# Patient Record
Sex: MALE | Race: WHITE | Employment: FULL TIME | URBAN - METROPOLITAN AREA
[De-identification: names, ages, dates, MRNs, and addresses within clinical notes are randomized per-mention and may not be internally consistent; named-entity substitution may affect disease eponyms.]

---

## 2024-05-28 ENCOUNTER — APPOINTMENT (OUTPATIENT)
Dept: GENERAL RADIOLOGY | Facility: HOSPITAL | Age: 44
End: 2024-05-28

## 2024-05-28 ENCOUNTER — APPOINTMENT (OUTPATIENT)
Dept: GENERAL RADIOLOGY | Facility: HOSPITAL | Age: 44
End: 2024-05-28
Attending: EMERGENCY MEDICINE

## 2024-05-28 ENCOUNTER — HOSPITAL ENCOUNTER (EMERGENCY)
Facility: HOSPITAL | Age: 44
Discharge: HOME | End: 2024-05-28
Attending: EMERGENCY MEDICINE

## 2024-05-28 VITALS
WEIGHT: 220 LBS | BODY MASS INDEX: 28.23 KG/M2 | RESPIRATION RATE: 16 BRPM | HEART RATE: 81 BPM | OXYGEN SATURATION: 98 % | DIASTOLIC BLOOD PRESSURE: 83 MMHG | TEMPERATURE: 98 F | HEIGHT: 74 IN | SYSTOLIC BLOOD PRESSURE: 138 MMHG

## 2024-05-28 DIAGNOSIS — S92.002B: Primary | ICD-10-CM

## 2024-05-28 PROCEDURE — 72100 X-RAY EXAM L-S SPINE 2/3 VWS: CPT | Performed by: EMERGENCY MEDICINE

## 2024-05-28 PROCEDURE — 73650 X-RAY EXAM OF HEEL: CPT | Performed by: EMERGENCY MEDICINE

## 2024-05-28 PROCEDURE — 99284 EMERGENCY DEPT VISIT MOD MDM: CPT

## 2024-05-28 PROCEDURE — 73610 X-RAY EXAM OF ANKLE: CPT

## 2024-05-28 PROCEDURE — 72072 X-RAY EXAM THORAC SPINE 3VWS: CPT | Performed by: EMERGENCY MEDICINE

## 2024-05-28 PROCEDURE — 29515 APPLICATION SHORT LEG SPLINT: CPT

## 2024-05-28 RX ORDER — HYDROCODONE BITARTRATE AND ACETAMINOPHEN 5; 325 MG/1; MG/1
1 TABLET ORAL EVERY 4 HOURS PRN
Qty: 20 TABLET | Refills: 0 | Status: SHIPPED | OUTPATIENT
Start: 2024-05-28 | End: 2024-05-30

## 2024-05-28 RX ORDER — HYDROCODONE BITARTRATE AND ACETAMINOPHEN 5; 325 MG/1; MG/1
1 TABLET ORAL ONCE
Status: COMPLETED | OUTPATIENT
Start: 2024-05-28 | End: 2024-05-28

## 2024-05-28 NOTE — ED INITIAL ASSESSMENT (HPI)
Pt to ED c/o left leg pain after falling off of truck trailer, patient states trailer approximately 8 feet tall, patient land on feet and is now c/o left ankle pain, states right foot slightly painful, patient denies hitting head/hitting any injuries

## 2024-05-29 NOTE — ED PROVIDER NOTES
Patient Seen in: [facility] Emergency Department      History     Chief Complaint   Patient presents with    Leg or Foot Injury     Stated Complaint: Left leg pain / injury    Subjective:   HPI    44-year-old male complaining of left heel pain.  The patient fell about 8 feet landing on his feet complaining of pain in his left heel slight pain in his right is mild low back pain no knee pain no other injury    Objective:   History reviewed. No pertinent past medical history.           History reviewed. No pertinent surgical history.             Social History     Socioeconomic History    Marital status:    Tobacco Use    Smoking status: Some Days     Types: Cigarettes    Smokeless tobacco: Never   Substance and Sexual Activity    Alcohol use: Not Currently    Drug use: Not Currently              Review of Systems    Positive for stated complaint: Left leg pain / injury  Other systems are as noted in HPI.  Constitutional and vital signs reviewed.      All other systems reviewed and negative except as noted above.    Physical Exam     ED Triage Vitals [05/28/24 1555]   /71   Pulse 94   Resp 18   Temp 98.1 °F (36.7 °C)   Temp src Temporal   SpO2 100 %   O2 Device None (Room air)       Current Vitals:   Vital Signs  BP: 138/83  Pulse: 81  Resp: 16  Temp: 98.1 °F (36.7 °C)  Temp src: Temporal    Oxygen Therapy  SpO2: 98 %  O2 Device: None (Room air)            Physical Exam    Patient is alert orient x 3 no acute distress the head and neck are nontender the back there is mild tenderness of the thoracolumbar region of the lower extremities and knees are nontender the right heel is mildly tender no swelling deformity neurovascular is intact close tendons intact.  The left lower extremity there is moderate swelling of the plantar surface overlying the calcaneus the lateral medial malleoli are nontender Achilles tendons intact the rest the foot is nontender neurovascular is intact.    ED Course   Labs  Reviewed - No data to display          XR THORACIC SPINE (3 VIEWS) (CPT=72072)    Result Date: 5/28/2024  CONCLUSION:  There is mild degenerative disc disease.  There is no acute abnormality.   LOCATION:  [facility]    Dictated by (CST): João Chen MD on 5/28/2024 at 8:12 PM     Finalized by (CST): João Chen MD on 5/28/2024 at 8:13 PM       XR LUMBAR SPINE (MIN 2 VIEWS) (CPT=72100)    Result Date: 5/28/2024  CONCLUSION:  Unremarkable lumbar spine radiographs.   LOCATION:  [facility]   Dictated by (CST): João Chen MD on 5/28/2024 at 8:12 PM     Finalized by (CST): João Chen MD on 5/28/2024 at 8:12 PM       XR HEEL (CALCANEUS) (MIN 2 VIEWS), LEFT (CPT=73650)    Result Date: 5/28/2024  CONCLUSION:  Comminuted fracture of calcaneus extends to the subtalar joint.   LOCATION:  [facility]   Dictated by (CST): João Chen MD on 5/28/2024 at 8:12 PM     Finalized by (CST): João Chen MD on 5/28/2024 at 8:12 PM       XR HEEL (CALCANEUS) (MIN 2 VIEWS), RIGHT (CPT=73650)    Result Date: 5/28/2024  CONCLUSION:  There is an enthesophyte at the dorsal aspect of the calcaneus.  There is no acute fracture.   LOCATION:  [facility]   Dictated by (CST): João Chen MD on 5/28/2024 at 8:11 PM     Finalized by (CST): João Chen MD on 5/28/2024 at 8:11 PM       XR ANKLE (MIN 3 VIEWS), LEFT (CPT=73610)    Result Date: 5/28/2024  CONCLUSION:  Comminuted calcaneal fracture.    LOCATION:  [facility]   Dictated by (CST): Marcelino Hwang MD on 5/28/2024 at 5:18 PM     Finalized by (CST): Marcelino Hwang MD on 5/28/2024 at 5:20 PM      Images independently reviewed the patient has comminuted calcaneal fracture on the left.         MDM      Initial differential diagnoses considered but not limited to includes foot fracture calcaneal fracture contusion sprain    A short leg mold was placed on the left heel the splint position checked CMS intact case was discussed with orthopedic service patient was given Norco crutches advised  nonweightbearing follow-up orthopedic doctor in a few days                                   Medical Decision Making      Disposition and Plan     Clinical Impression:  1. Unspecified fracture of left calcaneus, initial encounter for open fracture         Disposition:  Discharge  5/28/2024  8:39 pm    Follow-up:  João Castellanos MD  [address] [place] [id number]  [phone]    Follow up in 1 week(s)            Medications Prescribed:  Discharge Medication List as of 5/28/2024  9:01 PM        START taking these medications    Details   HYDROcodone-acetaminophen 5-325 MG Oral Tab Take 1 tablet by mouth every 4 (four) hours as needed for Pain., Normal, Disp-20 tablet, R-0

## 2024-05-29 NOTE — DISCHARGE INSTRUCTIONS
Appearing on the left leg.  Use crutches.  Tylenol or Advil for milder pain Norco for more severe pain.  Norco may make you drowsy, nauseated, or constipated.  Do not drink alcohol or drive or operate heavy machinery while taking this..  Follow-up with orthopedic surgery next week.

## 2024-06-04 NOTE — H&P (VIEW-ONLY)
Orthopaedic Foot & Ankle Surgery  [facility]  New Fracture Care Note  Patient:  Carl Dawn   :  1980 - 44 year old male   [facility] Medical Record: [id number]  Date of Service:  2024   CHIEF COMPLAINT / REASON FOR VISIT   This is a NEW PATIENT VISIT for LEFT HEEL PAIN, SWELLING, INJURY, FRACTURE  Patient presents with:  Consult: **WorkComp** Evaluate left calcaneous. Patient states on 2024 fell from truck trailer while working(Approx 8 feet) injuring left heel. Went to [facility]. X-rayed and splinted. Referred for follow-up care and discuss possible surgery.     History of Present Illness   The patient describes their injury as follows:  Follow up from [facility] ER  Location: CLOSED INTRA-ARTICULAR JOINT DEPRESSION CALCANEUS FRACTURE  Quality: The pain is described as DULL  Severity: The pain rates 3/10 and ranges from 1/10 to 4/10   Duration: This injury occurred 1 WEEK(S) ago and resulted from - TRAUMATIC EVENT - FALL FROM A HEIGHT - fell off a tractor trailer  Timing: The pain has been CONSTANT  Context: The location has been WITHOUT CHANGE. The quality has been WITHOUT CHANGE. The severity has been CHANGING  - BECOMING LESS PAINFUL AND BOTHERSOME  Modifying Factors: The pain improves with REST AND AVOIDING ACTIVITIES THAT CAUSE PAIN, IMMOBILIZATION WITH Immobilization type: SHORT LEG SPLINT with SIDE SLABS  and worsens with DEPENDENCY OF THE LIMB.  Associated Signs/Symptoms: SWELLING and BRUISING also occurs with this injury.  Other injuries: DENIES  Prior Treatment: ER / ICC VISIT, XRAYS, Immobilization type: SHORT LEG SPLINT with SIDE SLABS, and CRUTCHES  Past Medical History   History reviewed. No pertinent past medical history.  HISTORY OF VENOUS THROMBOEMBOLIC DISEASE: Patient denies personal or family VTED history  Past Surgical History   History reviewed. No pertinent surgical history.  Social and Family History   Social History    Tobacco Use      Smoking status: Every Day        Types:  Cigarettes      Smokeless tobacco: Never    Alcohol use: Yes      Comment: Rare    Drug use: Never    History reviewed. No pertinent family history.  Current Medications     Current Outpatient Medications   Medication Sig Dispense Refill   • aspirin 81 MG Oral Tab EC Take 1 tablet (81 mg total) by mouth daily. 30 tablet 0   • docusate sodium (COLACE) 100 MG Oral Cap Take 1 capsule (100 mg total) by mouth 2 (two) times daily. Begin the night after surgery 100 capsule 0   • gabapentin 300 MG Oral Cap Take 1 capsule (300 mg total) by mouth nightly for 7 days. 7 capsule 0   • oxyCODONE 5 MG Oral Tab Take 1 tablet (5 mg total) by mouth every 6 (six) hours as needed for Pain (for pain more than 7/10). 15 tablet 0   • acetaminophen 500 MG Oral Cap Take 2 capsules (1,000 mg total) by mouth every 6 (six) hours. Two pills every six hours until pain is less than 5/10, then take as needed 100 capsule 0     Allergies   No Known Allergies  Physical Examination   VITALS: @ BMI: Estimated body mass index is 29.69 kg/m² as calculated from the following:    Height as of this encounter: 6' 1\" (1.854 m).    Weight as of this encounter: 225 lb (102.1 kg).  Constitutional: Patient is well-developed, well-nourished, and in no distress.   HENT: Normocephalic and atraumatic. Moist mucous membranes   Eyes: Clear Conjunctivae Right & Left eye without discharge. No scleral icterus, bilateral.   Neck: Neck supple. No JVD, Tracheal deviation or thyromegaly visible.  Cardiovascular: Normal pulses  Pulmonary/Chest: Effort normal. No audible stridor or wheezes No respiratory distress  Abdominal: No visible distension.   Neurological: Alert and oriented to person, place, and time. GCS score is 15.   Skin: Warm, dry Normal turgor non-diaphoretic. No rashes. No erythema. No pallor.   Psychiatric: Mood, memory, affect and judgment normal.     MUSCULOSKELETAL: The affected extremity was examined.  The patient presents in Immobilization type: SHORT LEG  SPLINT with SIDE SLABS using CRUTCHES  It was removed for the examination  Tenderness at fracture site: MILD  Swelling around fracture site: MODERATE  Bruising MODERATE  Motor Strength: DIMINISHED 4/5  Light touch sensation: Intact  Pulses: Intact  There is visble heel varus and lateral wall blowout  XRAY & ADVANCED IMAGING INTERPRETATION    Prior Imaging: YES Foot 4 views NWB Left  Fracture - INTRA-ARTICULAR JOINT DEPRESSION CALCANEUS FRACTURE  No image results found.    DIAGNOSES:   Diagnoses and all orders for this visit:    Closed displaced intra-articular fracture of left calcaneus, initial encounter  -     CT FOOT LEFT (CPT=73700); Future  -     COMPLETE BLOOD COUNT (CBC) WITHOUT DIFFERENTIAL; Future  -     COMPREHENSIVE METABOLIC PANEL; Future  -     HEMOGLOBIN A1C; Future  -     VITAMIN D, 25-HYDROXY; Future    Preoperative testing  -     COMPLETE BLOOD COUNT (CBC) WITHOUT DIFFERENTIAL; Future  -     COMPREHENSIVE METABOLIC PANEL; Future  -     HEMOGLOBIN A1C; Future  -     VITAMIN D, 25-HYDROXY; Future    Hypovitaminosis D  -     VITAMIN D, 25-HYDROXY; Future    Other orders  -     aspirin 81 MG Oral Tab EC; Take 1 tablet (81 mg total) by mouth daily.  -     docusate sodium (COLACE) 100 MG Oral Cap; Take 1 capsule (100 mg total) by mouth 2 (two) times daily. Begin the night after surgery  -     gabapentin 300 MG Oral Cap; Take 1 capsule (300 mg total) by mouth nightly for 7 days.  -     oxyCODONE 5 MG Oral Tab; Take 1 tablet (5 mg total) by mouth every 6 (six) hours as needed for Pain (for pain more than 7/10).  -     acetaminophen 500 MG Oral Cap; Take 2 capsules (1,000 mg total) by mouth every 6 (six) hours. Two pills every six hours until pain is less than 5/10, then take as needed      PATIENT ASSESSMENT & MEDICAL DECISION-MAKING:   DME dispensed at this encounter - Immobilization type: SHORT LEG SPLINT with SIDE SLABS NO ASSISTIVE AIDS  Will need ct scan for planning the ORIF  Based on my evaluation  today, impression, recommendations and plan is:  The treatment plan is:  -Begin Fracture care  - RIICE AND PREPARE FOR ORIF Non-weight bearing Immobilization type: SHORT LEG SPLINT with SIDE SLABS  -to use non-opioid before opioid medications to mange pain  -to continue elevating affected limb above heart level to reduce swelling  -to [facility] Physical Therapy  -Activity status: Work Status NO WORK - unable to perform work of any kind   A letter was given to the patient.    The patient was instructed to return for evaluation:   prepare for   surgery  At the next visit, the patient will not require xrays. N/A    The total time I spent during today's encounter was more than 45 minutes. This time includes review of medical records, interview and examination, independently interpreting results, counseling and education, ordering medications, tests and/or procedures, coordination and/or communicating with other medical providers and documenting the encounter in the EMR. All the patient's questions were answered and the patient voiced understanding of their health issues and a willingness to proceed with the treatment we agreed upon.      The patient verbalized understanding of and willingness to comply with the treatment plan.  The patient will use the [facility] main phone number if they have questions, concerns or problems  I will assess the patient's progress at the next visit and determine the next steps in the treatment plan.     IJoão MD performed all aspects of the evaluation and management  of this encounter. I diagnosed the patient and formulated the treatment options. This information was presented to and discussed with the patient including risk and benefits. All questions were answered and the patient acknowledged understanding The patient, with my input, determined how to proceed.

## 2024-06-14 ENCOUNTER — APPOINTMENT (OUTPATIENT)
Dept: GENERAL RADIOLOGY | Facility: HOSPITAL | Age: 44
End: 2024-06-14
Attending: ORTHOPAEDIC SURGERY

## 2024-06-14 ENCOUNTER — HOSPITAL ENCOUNTER (OUTPATIENT)
Facility: HOSPITAL | Age: 44
Discharge: HOME | End: 2024-06-15
Attending: ORTHOPAEDIC SURGERY | Admitting: ORTHOPAEDIC SURGERY

## 2024-06-14 ENCOUNTER — ANESTHESIA (OUTPATIENT)
Dept: SURGERY | Facility: HOSPITAL | Age: 44
End: 2024-06-14
Payer: OTHER MISCELLANEOUS

## 2024-06-14 ENCOUNTER — ANESTHESIA EVENT (OUTPATIENT)
Dept: SURGERY | Facility: HOSPITAL | Age: 44
End: 2024-06-14
Payer: OTHER MISCELLANEOUS

## 2024-06-14 PROBLEM — S92.012A DISPLACED FRACTURE OF BODY OF LEFT CALCANEUS, INITIAL ENCOUNTER FOR CLOSED FRACTURE: Status: ACTIVE | Noted: 2024-06-14

## 2024-06-14 PROBLEM — Z98.890 S/P ORIF (OPEN REDUCTION INTERNAL FIXATION) FRACTURE: Status: ACTIVE | Noted: 2024-06-14

## 2024-06-14 PROBLEM — Z87.81 S/P ORIF (OPEN REDUCTION INTERNAL FIXATION) FRACTURE: Status: ACTIVE | Noted: 2024-06-14

## 2024-06-14 LAB
CREAT BLD-MCNC: 1.22 MG/DL
EGFRCR SERPLBLD CKD-EPI 2021: 75 ML/MIN/1.73M2 (ref 60–?)

## 2024-06-14 PROCEDURE — 3E0V0GB INTRODUCTION OF RECOMBINANT BONE MORPHOGENETIC PROTEIN INTO BONES, OPEN APPROACH: ICD-10-PCS | Performed by: ORTHOPAEDIC SURGERY

## 2024-06-14 PROCEDURE — 76000 FLUOROSCOPY <1 HR PHYS/QHP: CPT | Performed by: ORTHOPAEDIC SURGERY

## 2024-06-14 PROCEDURE — 99243 OFF/OP CNSLTJ NEW/EST LOW 30: CPT | Performed by: INTERNAL MEDICINE

## 2024-06-14 PROCEDURE — 76942 ECHO GUIDE FOR BIOPSY: CPT | Performed by: ANESTHESIOLOGY

## 2024-06-14 PROCEDURE — 0QSM04Z REPOSITION LEFT TARSAL WITH INTERNAL FIXATION DEVICE, OPEN APPROACH: ICD-10-PCS | Performed by: ORTHOPAEDIC SURGERY

## 2024-06-14 DEVICE — IMPLANTABLE DEVICE: Type: IMPLANTABLE DEVICE | Site: ANKLE | Status: FUNCTIONAL

## 2024-06-14 DEVICE — DBX PUTTY, 10CC
Type: IMPLANTABLE DEVICE | Site: ANKLE | Status: FUNCTIONAL
Brand: DBX®

## 2024-06-14 DEVICE — K WIRE 2X150MM TRCR PT SS: Type: IMPLANTABLE DEVICE | Site: ANKLE

## 2024-06-14 DEVICE — K WIRE 1.25X150MM TRCR PT SS: Type: IMPLANTABLE DEVICE | Site: ANKLE

## 2024-06-14 DEVICE — SCREW BNE 3.5X30MM CORT HEX DRV RECESS FT ST: Type: IMPLANTABLE DEVICE | Site: ANKLE | Status: FUNCTIONAL

## 2024-06-14 DEVICE — CANCELLOUS CHIPS 1-4MM 15CC: Type: IMPLANTABLE DEVICE | Site: ANKLE | Status: FUNCTIONAL

## 2024-06-14 RX ORDER — NEOSTIGMINE METHYLSULFATE 1 MG/ML
INJECTION, SOLUTION INTRAVENOUS AS NEEDED
Status: DISCONTINUED | OUTPATIENT
Start: 2024-06-14 | End: 2024-06-14 | Stop reason: SURG

## 2024-06-14 RX ORDER — POLYETHYLENE GLYCOL 3350 17 G/17G
17 POWDER, FOR SOLUTION ORAL DAILY PRN
Status: DISCONTINUED | OUTPATIENT
Start: 2024-06-14 | End: 2024-06-15

## 2024-06-14 RX ORDER — DOCUSATE SODIUM 100 MG/1
100 CAPSULE, LIQUID FILLED ORAL 2 TIMES DAILY
Status: DISCONTINUED | OUTPATIENT
Start: 2024-06-14 | End: 2024-06-14

## 2024-06-14 RX ORDER — OXYCODONE HYDROCHLORIDE 5 MG/1
10 TABLET ORAL ONCE AS NEEDED
Status: DISCONTINUED | OUTPATIENT
Start: 2024-06-14 | End: 2024-06-14 | Stop reason: HOSPADM

## 2024-06-14 RX ORDER — BISACODYL 10 MG
10 SUPPOSITORY, RECTAL RECTAL
Status: DISCONTINUED | OUTPATIENT
Start: 2024-06-14 | End: 2024-06-14 | Stop reason: SDUPTHER

## 2024-06-14 RX ORDER — SENNOSIDES 8.6 MG
17.2 TABLET ORAL NIGHTLY
Status: DISCONTINUED | OUTPATIENT
Start: 2024-06-14 | End: 2024-06-15

## 2024-06-14 RX ORDER — PROCHLORPERAZINE EDISYLATE 5 MG/ML
5 INJECTION INTRAMUSCULAR; INTRAVENOUS EVERY 8 HOURS PRN
Status: DISCONTINUED | OUTPATIENT
Start: 2024-06-14 | End: 2024-06-14 | Stop reason: HOSPADM

## 2024-06-14 RX ORDER — LIDOCAINE HYDROCHLORIDE 10 MG/ML
INJECTION, SOLUTION EPIDURAL; INFILTRATION; INTRACAUDAL; PERINEURAL AS NEEDED
Status: DISCONTINUED | OUTPATIENT
Start: 2024-06-14 | End: 2024-06-14 | Stop reason: SURG

## 2024-06-14 RX ORDER — PSEUDOEPHEDRINE HCL 30 MG
100 TABLET ORAL 2 TIMES DAILY
COMMUNITY
Start: 2024-06-04

## 2024-06-14 RX ORDER — ONDANSETRON 2 MG/ML
INJECTION INTRAMUSCULAR; INTRAVENOUS AS NEEDED
Status: DISCONTINUED | OUTPATIENT
Start: 2024-06-14 | End: 2024-06-14 | Stop reason: SURG

## 2024-06-14 RX ORDER — MEPERIDINE HYDROCHLORIDE 25 MG/ML
12.5 INJECTION INTRAMUSCULAR; INTRAVENOUS; SUBCUTANEOUS AS NEEDED
Status: DISCONTINUED | OUTPATIENT
Start: 2024-06-14 | End: 2024-06-14 | Stop reason: HOSPADM

## 2024-06-14 RX ORDER — DOCUSATE SODIUM 100 MG/1
100 CAPSULE, LIQUID FILLED ORAL 2 TIMES DAILY
Status: DISCONTINUED | OUTPATIENT
Start: 2024-06-14 | End: 2024-06-15

## 2024-06-14 RX ORDER — ONDANSETRON 2 MG/ML
4 INJECTION INTRAMUSCULAR; INTRAVENOUS EVERY 6 HOURS PRN
Status: DISCONTINUED | OUTPATIENT
Start: 2024-06-14 | End: 2024-06-15

## 2024-06-14 RX ORDER — SODIUM CHLORIDE, SODIUM LACTATE, POTASSIUM CHLORIDE, CALCIUM CHLORIDE 600; 310; 30; 20 MG/100ML; MG/100ML; MG/100ML; MG/100ML
INJECTION, SOLUTION INTRAVENOUS CONTINUOUS
Status: DISCONTINUED | OUTPATIENT
Start: 2024-06-14 | End: 2024-06-14 | Stop reason: HOSPADM

## 2024-06-14 RX ORDER — DEXAMETHASONE SODIUM PHOSPHATE 4 MG/ML
VIAL (ML) INJECTION AS NEEDED
Status: DISCONTINUED | OUTPATIENT
Start: 2024-06-14 | End: 2024-06-14 | Stop reason: SURG

## 2024-06-14 RX ORDER — SCOLOPAMINE TRANSDERMAL SYSTEM 1 MG/1
1 PATCH, EXTENDED RELEASE TRANSDERMAL ONCE
Status: DISCONTINUED | OUTPATIENT
Start: 2024-06-14 | End: 2024-06-15

## 2024-06-14 RX ORDER — SODIUM CHLORIDE, SODIUM LACTATE, POTASSIUM CHLORIDE, CALCIUM CHLORIDE 600; 310; 30; 20 MG/100ML; MG/100ML; MG/100ML; MG/100ML
INJECTION, SOLUTION INTRAVENOUS CONTINUOUS
Status: DISCONTINUED | OUTPATIENT
Start: 2024-06-14 | End: 2024-06-14

## 2024-06-14 RX ORDER — HYDROMORPHONE HYDROCHLORIDE 1 MG/ML
0.6 INJECTION, SOLUTION INTRAMUSCULAR; INTRAVENOUS; SUBCUTANEOUS EVERY 5 MIN PRN
Status: DISCONTINUED | OUTPATIENT
Start: 2024-06-14 | End: 2024-06-14 | Stop reason: HOSPADM

## 2024-06-14 RX ORDER — HYDROMORPHONE HYDROCHLORIDE 1 MG/ML
0.4 INJECTION, SOLUTION INTRAMUSCULAR; INTRAVENOUS; SUBCUTANEOUS EVERY 5 MIN PRN
Status: DISCONTINUED | OUTPATIENT
Start: 2024-06-14 | End: 2024-06-14 | Stop reason: HOSPADM

## 2024-06-14 RX ORDER — ENOXAPARIN SODIUM 100 MG/ML
40 INJECTION SUBCUTANEOUS DAILY
Status: DISCONTINUED | OUTPATIENT
Start: 2024-06-14 | End: 2024-06-14

## 2024-06-14 RX ORDER — DIPHENHYDRAMINE HYDROCHLORIDE 50 MG/ML
12.5 INJECTION INTRAMUSCULAR; INTRAVENOUS AS NEEDED
Status: DISCONTINUED | OUTPATIENT
Start: 2024-06-14 | End: 2024-06-14 | Stop reason: HOSPADM

## 2024-06-14 RX ORDER — HYDROCODONE BITARTRATE AND ACETAMINOPHEN 5; 325 MG/1; MG/1
1 TABLET ORAL EVERY 6 HOURS PRN
Status: DISCONTINUED | OUTPATIENT
Start: 2024-06-14 | End: 2024-06-15

## 2024-06-14 RX ORDER — HYDROMORPHONE HYDROCHLORIDE 1 MG/ML
0.2 INJECTION, SOLUTION INTRAMUSCULAR; INTRAVENOUS; SUBCUTANEOUS EVERY 5 MIN PRN
Status: DISCONTINUED | OUTPATIENT
Start: 2024-06-14 | End: 2024-06-14 | Stop reason: HOSPADM

## 2024-06-14 RX ORDER — BUPIVACAINE HYDROCHLORIDE 5 MG/ML
INJECTION, SOLUTION EPIDURAL; INTRACAUDAL AS NEEDED
Status: DISCONTINUED | OUTPATIENT
Start: 2024-06-14 | End: 2024-06-14 | Stop reason: SURG

## 2024-06-14 RX ORDER — ACETAMINOPHEN 500 MG
1000 TABLET ORAL ONCE
Status: DISCONTINUED | OUTPATIENT
Start: 2024-06-14 | End: 2024-06-15

## 2024-06-14 RX ORDER — BISACODYL 10 MG
10 SUPPOSITORY, RECTAL RECTAL
Status: DISCONTINUED | OUTPATIENT
Start: 2024-06-14 | End: 2024-06-15

## 2024-06-14 RX ORDER — DOXEPIN HYDROCHLORIDE 50 MG/1
1 CAPSULE ORAL DAILY
Status: DISCONTINUED | OUTPATIENT
Start: 2024-06-15 | End: 2024-06-15

## 2024-06-14 RX ORDER — NALOXONE HYDROCHLORIDE 0.4 MG/ML
0.08 INJECTION, SOLUTION INTRAMUSCULAR; INTRAVENOUS; SUBCUTANEOUS AS NEEDED
Status: DISCONTINUED | OUTPATIENT
Start: 2024-06-14 | End: 2024-06-14 | Stop reason: HOSPADM

## 2024-06-14 RX ORDER — SENNOSIDES 8.6 MG
17.2 TABLET ORAL NIGHTLY
Status: DISCONTINUED | OUTPATIENT
Start: 2024-06-14 | End: 2024-06-14

## 2024-06-14 RX ORDER — OXYCODONE HYDROCHLORIDE 5 MG/1
5 TABLET ORAL ONCE AS NEEDED
Status: DISCONTINUED | OUTPATIENT
Start: 2024-06-14 | End: 2024-06-14 | Stop reason: HOSPADM

## 2024-06-14 RX ORDER — ACETAMINOPHEN 10 MG/ML
INJECTION, SOLUTION INTRAVENOUS AS NEEDED
Status: DISCONTINUED | OUTPATIENT
Start: 2024-06-14 | End: 2024-06-14 | Stop reason: SURG

## 2024-06-14 RX ORDER — MIDAZOLAM HYDROCHLORIDE 1 MG/ML
1 INJECTION INTRAMUSCULAR; INTRAVENOUS EVERY 5 MIN PRN
Status: DISCONTINUED | OUTPATIENT
Start: 2024-06-14 | End: 2024-06-14 | Stop reason: HOSPADM

## 2024-06-14 RX ORDER — ROCURONIUM BROMIDE 10 MG/ML
INJECTION, SOLUTION INTRAVENOUS AS NEEDED
Status: DISCONTINUED | OUTPATIENT
Start: 2024-06-14 | End: 2024-06-14 | Stop reason: SURG

## 2024-06-14 RX ORDER — ENOXAPARIN SODIUM 100 MG/ML
40 INJECTION SUBCUTANEOUS DAILY
Status: DISCONTINUED | OUTPATIENT
Start: 2024-06-15 | End: 2024-06-15

## 2024-06-14 RX ORDER — ONDANSETRON 2 MG/ML
4 INJECTION INTRAMUSCULAR; INTRAVENOUS EVERY 6 HOURS PRN
Status: DISCONTINUED | OUTPATIENT
Start: 2024-06-14 | End: 2024-06-14 | Stop reason: HOSPADM

## 2024-06-14 RX ORDER — GLYCOPYRROLATE 0.2 MG/ML
INJECTION, SOLUTION INTRAMUSCULAR; INTRAVENOUS AS NEEDED
Status: DISCONTINUED | OUTPATIENT
Start: 2024-06-14 | End: 2024-06-14 | Stop reason: SURG

## 2024-06-14 RX ORDER — OXYCODONE HYDROCHLORIDE 5 MG/1
5 TABLET ORAL EVERY 6 HOURS PRN
COMMUNITY
Start: 2024-06-04 | End: 2024-06-15

## 2024-06-14 RX ORDER — ENEMA 19; 7 G/133ML; G/133ML
1 ENEMA RECTAL ONCE AS NEEDED
Status: DISCONTINUED | OUTPATIENT
Start: 2024-06-14 | End: 2024-06-15

## 2024-06-14 RX ORDER — DEXAMETHASONE SODIUM PHOSPHATE 10 MG/ML
INJECTION, SOLUTION INTRAMUSCULAR; INTRAVENOUS AS NEEDED
Status: DISCONTINUED | OUTPATIENT
Start: 2024-06-14 | End: 2024-06-14 | Stop reason: SURG

## 2024-06-14 RX ADMIN — ROCURONIUM BROMIDE 50 MG: 10 INJECTION, SOLUTION INTRAVENOUS at 09:24:00

## 2024-06-14 RX ADMIN — ACETAMINOPHEN 1000 MG: 10 INJECTION, SOLUTION INTRAVENOUS at 11:37:00

## 2024-06-14 RX ADMIN — DEXAMETHASONE SODIUM PHOSPHATE 2 MG: 10 INJECTION, SOLUTION INTRAMUSCULAR; INTRAVENOUS at 09:31:00

## 2024-06-14 RX ADMIN — ONDANSETRON 4 MG: 2 INJECTION INTRAMUSCULAR; INTRAVENOUS at 11:37:00

## 2024-06-14 RX ADMIN — BUPIVACAINE HYDROCHLORIDE 30 ML: 5 INJECTION, SOLUTION EPIDURAL; INTRACAUDAL at 09:31:00

## 2024-06-14 RX ADMIN — LIDOCAINE HYDROCHLORIDE 50 MG: 10 INJECTION, SOLUTION EPIDURAL; INFILTRATION; INTRACAUDAL; PERINEURAL at 09:24:00

## 2024-06-14 RX ADMIN — SODIUM CHLORIDE, SODIUM LACTATE, POTASSIUM CHLORIDE, CALCIUM CHLORIDE: 600; 310; 30; 20 INJECTION, SOLUTION INTRAVENOUS at 09:19:00

## 2024-06-14 RX ADMIN — NEOSTIGMINE METHYLSULFATE 3.5 MG: 1 INJECTION, SOLUTION INTRAVENOUS at 11:39:00

## 2024-06-14 RX ADMIN — GLYCOPYRROLATE 0.4 MG: 0.2 INJECTION, SOLUTION INTRAMUSCULAR; INTRAVENOUS at 11:39:00

## 2024-06-14 RX ADMIN — SODIUM CHLORIDE, SODIUM LACTATE, POTASSIUM CHLORIDE, CALCIUM CHLORIDE: 600; 310; 30; 20 INJECTION, SOLUTION INTRAVENOUS at 12:06:00

## 2024-06-14 RX ADMIN — DEXAMETHASONE SODIUM PHOSPHATE 8 MG: 4 MG/ML VIAL (ML) INJECTION at 09:34:00

## 2024-06-14 NOTE — BRIEF OP NOTE
Pre-Operative Diagnosis: DISPLACED INTRA-ARTICULAR FRACTURE OF LEFT CALCANEUS, INITIAL ENCOUNTER     Post-Operative Diagnosis: DISPLACED INTRA-ARTICULAR FRACTURE OF LEFT CALCANEUS, INITIAL ENCOUNTER      Procedure Performed:   LEFT OPEN REPAIR CALCANEUS FRACTURE    Surgeons and Role:     * João Castellanos MD - Primary    Assistant(s):  PA: Rosa Doherty PA     Surgical Findings: DISPLACED INTRA-ARTICULAR FRACTURE OF THE LEFT CALCANEUS         Estimated Blood Loss: No data recorded        MARTHA Hung  6/14/2024  12:19 PM

## 2024-06-14 NOTE — ANESTHESIA PROCEDURE NOTES
Regional Block    Date/Time: 6/14/2024 9:58 AM    Performed by: Santos Menard CRNA  Authorized by: Arian Barahona MD      General Information and Staff    Start Time:  6/14/2024 9:58 AM  End Time:  6/14/2024 9:58 AM  Anesthesiologist:  Arian Barahona MD  CRNA:  Santos Menard CRNA  Performed by:  CRNA  Patient Location:  OR    Block Placement: Post Induction  Site Identification: real time ultrasound guided and image stored and retrievable    Block site/laterality marked before start: site marked  Reason for Block: at surgeon's request and post-op pain management    Preanesthetic Checklist: 2 patient identifers, IV checked, site marked, risks and benefits discussed, monitors and equipment checked, pre-op evaluation, timeout performed, anesthesia consent, sterile technique used, no prohibitive neurological deficits and no local skin infection at insertion site      Procedure Details    Patient Position:  Supine  Prep: ChloraPrep    Monitoring:  Cardiac monitor, continuous pulse ox, blood pressure cuff and heart rate  Block Type:  Popliteal  Laterality:  Left  Injection Technique:  Single-shot    Needle    Needle Type:  Short-bevel and echogenic  Needle Gauge:  21 G  Needle Length:  100 mm  Needle Localization:  Ultrasound guidance  Reason for Ultrasound Use: appropriate spread of the medication was noted in real time and no ultrasound evidence of intravascular and/or intraneural injection            Assessment    Injection Assessment:  Good spread noted, negative resistance, negative aspiration for heme, incremental injection, low pressure and local visualized surrounding nerve on ultrasound  Heart Rate Change: No    - Patient tolerated block procedure well without evidence of immediate block related complications.     Medications  6/14/2024 9:58 AM      Additional Comments    Medication:  Bupivacaine 0.5% 30mL  with 2mg PF decadron

## 2024-06-14 NOTE — CONSULTS
[facility]OSPITAL[facility]  CONSULT     Carl Dawn Patient Status:  Outpatient in a Bed    1980 MRN [id number]   Location [facility] 3SW-A Attending João Castellanos MD   Hosp Day # 0 PCP None Pcp     Reason for consult: Medical management    Requested by: Dr. João Castellanos    Subjective:   History of Present Illness:     Carl Dawn is a 44 year old male with pseudocholinesterase deficiency, recent closed displaced intra-articular fracture of left calcaneus while at work is admitted status post left open repair of the calcaneus fracture.  Patient fell about 8 feet from truck trailer while working injuring his left heel.      Patient speaks limited English. He has a history of 1 PPD smoking.     History/Other:    Past Medical History:  Past Medical History:    Hx of motion sickness     Past Surgical History:   Past Surgical History:   Procedure Laterality Date    Appendectomy        Family History:   History reviewed. No pertinent family history.  Social History:    reports that he has been smoking cigarettes. He has never used smokeless tobacco. He reports that he does not currently use alcohol. He reports that he does not currently use drugs.     Allergies: No Known Allergies    Medications:    Current Facility-Administered Medications on File Prior to Encounter   Medication Dose Route Frequency Provider Last Rate Last Admin    [COMPLETED] HYDROcodone-acetaminophen (Norco) 5-325 MG per tab 1 tablet  1 tablet Oral Once Samm Paige MD   1 tablet at 24 1850     Current Outpatient Medications on File Prior to Encounter   Medication Sig Dispense Refill    oxyCODONE 5 MG Oral Tab Take 1 tablet (5 mg total) by mouth every 6 (six) hours as needed for Pain.      Acetaminophen 500 MG Oral Cap Take 2 capsules (1,000 mg total) by mouth every 6 (six) hours.      docusate sodium 100 MG Oral Cap Take 100 mg by mouth 2 (two) times daily.         Review of Systems:   A comprehensive review of systems was completed.     Pertinent positives and negatives noted in the HPI.    Objective:   Physical Exam:    /60 (BP Location: Right arm)   Pulse 69   Temp 98 °F (36.7 °C) (Oral)   Resp 18   Ht 6' 1\" (1.854 m)   Wt 221 lb (100.2 kg)   SpO2 100%   BMI 29.16 kg/m²   General: No acute distress, Alert  Respiratory: No rhonchi, no wheezes  Cardiovascular: S1, S2. Regular rate and rhythm  Abdomen: Soft, NT/ND, +BS  Neuro: No new focal deficits  Extremities: LLE in dressing    Results:    Labs:      Labs Last 24 Hours:  No results for input(s): \"WBC\", \"HGB\", \"MCV\", \"PLT\", \"BAND\", \"INR\" in the last 168 hours.    Invalid input(s): \"LYM#\", \"MONO#\", \"BASOS#\", \"EOSIN#\"    No results for input(s): \"GLU\", \"BUN\", \"CREATSERUM\", \"GFRAA\", \"GFRNAA\", \"CA\", \"ALB\", \"NA\", \"K\", \"CL\", \"CO2\", \"ALKPHO\", \"AST\", \"ALT\", \"BILT\", \"TP\" in the last 168 hours.    No results for input(s): \"PTP\", \"INR\" in the last 168 hours.    No results for input(s): \"TROP\", \"CK\" in the last 168 hours.      Imaging: Imaging data reviewed in Epic.    Assessment & Plan:      #Closed displaced intra-articular fracture of left calcaneus status post open repair  -Per ortho  -PTOT  -Pain control   -DVT prophylaxis-lovenox    #Pseudocholinesterase deficiency - patient and spouse both unaware of this dx  #Tobacco abuse     Plan of care discussed with patient    Jenn Yañez MD  6/14/2024    The 21st Century Cures Act makes medical notes like these available to patients in the interest of transparency. Please be advised this is a medical document. Medical documents are intended to carry relevant information, facts as evident, and the clinical opinion of the practitioner. The medical note is intended as peer to peer communication and may appear blunt or direct. It is written in medical language and may contain abbreviations or verbiage that are unfamiliar.

## 2024-06-14 NOTE — ANESTHESIA PREPROCEDURE EVALUATION
PRE-OP EVALUATION    Patient Name: Carl Dawn    Admit Diagnosis: DISPLACED INTRA-ARTICULAR FRACTURE OF LEFT CALCANEUS, INITIAL ENCOUNTER    Pre-op Diagnosis: DISPLACED INTRA-ARTICULAR FRACTURE OF LEFT CALCANEUS, INITIAL ENCOUNTER    LEFT OPEN REPAIR CALCANEUS FRACTURE    Anesthesia Procedure: LEFT OPEN REPAIR CALCANEUS FRACTURE (Left: Ankle)    Surgeons and Role:     * João Castellanos MD - Primary    Pre-op vitals reviewed.  Temp: 98.3 °F (36.8 °C)  Pulse: 71  Resp: 16  BP: 106/76  SpO2: 97 %  Body mass index is 29.23 kg/m².    Current medications reviewed.  Hospital Medications:  • acetaminophen (Tylenol Extra Strength) tab 1,000 mg  1,000 mg Oral Once   • scopolamine (Transderm-Scop) 1 MG/3DAYS patch 1 patch  1 patch Transdermal Once   • lactated ringers infusion   Intravenous Continuous   • ceFAZolin (Ancef) 2g in 10mL IV syringe premix  2 g Intravenous Once       Outpatient Medications:     Medications Prior to Admission   Medication Sig Dispense Refill Last Dose   • oxyCODONE 5 MG Oral Tab Take 1 tablet (5 mg total) by mouth every 6 (six) hours as needed for Pain.   post op   • Acetaminophen 500 MG Oral Cap Take 2 capsules (1,000 mg total) by mouth every 6 (six) hours.   post op   • docusate sodium 100 MG Oral Cap Take 100 mg by mouth 2 (two) times daily.   post op       Allergies: Patient has no known allergies.      Anesthesia Evaluation    Patient summary reviewed.    Anesthetic Complications  (-) history of anesthetic complications         GI/Hepatic/Renal    Negative GI/hepatic/renal ROS.                             Cardiovascular    Negative cardiovascular ROS.    Exercise tolerance: good     MET: >4                                           Endo/Other    Negative endo/other ROS.                              Pulmonary    Negative pulmonary ROS.                       Neuro/Psych    Negative neuro/psych ROS.                                Past Surgical History:   Procedure Laterality Date   • Appendectomy        Social History     Socioeconomic History   • Marital status:    Tobacco Use   • Smoking status: Some Days     Types: Cigarettes   • Smokeless tobacco: Never   Vaping Use   • Vaping status: Never Used   Substance and Sexual Activity   • Alcohol use: Not Currently   • Drug use: Not Currently     History   Drug Use Unknown     Available pre-op labs reviewed.               Airway      Mallampati: II  Mouth opening: 3 FB  TM distance: > 6 cm  Neck ROM: full Cardiovascular    Cardiovascular exam normal.  Rhythm: regular  Rate: normal  (-) murmur   Dental    Dentition appears grossly intact         Pulmonary    Pulmonary exam normal.  Breath sounds clear to auscultation bilaterally.               Other findings        ASA: 1   Plan: general  NPO status verified and patient meets guidelines.        Comment: GETA discussed in detail.  Risk of sore throat, cough, dental trauma, PONV discussed.  The risks and benefits of regional anesthesia (popliteal block) have been explained to the patient as indicated on the anesthesia consent form, which has been signed. Patient expresses understanding and wishes to proceed. All questions answered.    Plan/risks discussed with: patient            Present on Admission:  **None**

## 2024-06-14 NOTE — PLAN OF CARE
Post-op day 0. Alert and oriented x4. Room air. Continuous pulse oximeter. Incentive spirometer education provided on usage and benefits. Physical therapy evaluation pending. Non-weightbearing to left lower extremity. Splint in place and elevated on multiple pillows. Hemovac drain care provided every 4 hours. Spouse at bedside for support.

## 2024-06-14 NOTE — ANESTHESIA POSTPROCEDURE EVALUATION
[facility] Patient Status:  Outpatient in a Bed   Age/Gender 44 year old male MRN [id number]   Location [facility] POST ANESTHESIA CARE UNIT Attending João Castellanos MD   Hosp Day # 0 PCP None Pcp       Anesthesia Post-op Note    LEFT OPEN REPAIR CALCANEUS FRACTURE    Procedure Summary       Date: 06/14/24 Room / Location:  MAIN OR 14 / [facility] MAIN OR    Anesthesia Start: 0918 Anesthesia Stop: 1209    Procedure: LEFT OPEN REPAIR CALCANEUS FRACTURE (Left: Ankle) Diagnosis: (DISPLACED INTRA-ARTICULAR FRACTURE OF LEFT CALCANEUS, INITIAL ENCOUNTER)    Surgeons: João Castellanos MD Anesthesiologist: Arian Barahona MD    Anesthesia Type: general ASA Status: 1            Anesthesia Type: No value filed.    Vitals Value Taken Time   /89 06/14/24 1209   Temp 97.9 06/14/24 1211   Pulse 70 06/14/24 1211   Resp 18 06/14/24 1211   SpO2 99 % 06/14/24 1211   Vitals shown include unfiled device data.    Patient Location: PACU    Anesthesia Type: general    Airway Patency: patent    Postop Pain Control: adequate    Mental Status: mildly sedated but able to meaningfully participate in the post-anesthesia evaluation    Nausea/Vomiting: none    Cardiopulmonary/Hydration status: stable euvolemic    Complications: no apparent anesthesia related complications    Postop vital signs: stable    Dental Exam: Unchanged from Preop    Patient to be discharged from PACU when criteria met.

## 2024-06-14 NOTE — INTERVAL H&P NOTE
Pre-op Diagnosis: DISPLACED INTRA-ARTICULAR FRACTURE OF LEFT CALCANEUS, INITIAL ENCOUNTER    The above referenced H&P was reviewed by João Castellanos MD on 6/14/2024, the patient was examined and no significant changes have occurred in the patient's condition since the H&P was performed.  I discussed with the patient and/or legal representative the potential benefits, risks and side effects of this procedure; the likelihood of the patient achieving goals; and potential problems that might occur during recuperation.  I discussed reasonable alternatives to the procedure, including risks, benefits and side effects related to the alternatives and risks related to not receiving this procedure.  We will proceed with procedure as planned.

## 2024-06-15 VITALS
BODY MASS INDEX: 29.29 KG/M2 | WEIGHT: 221 LBS | OXYGEN SATURATION: 98 % | HEART RATE: 71 BPM | HEIGHT: 73 IN | DIASTOLIC BLOOD PRESSURE: 60 MMHG | SYSTOLIC BLOOD PRESSURE: 111 MMHG | TEMPERATURE: 98 F | RESPIRATION RATE: 18 BRPM

## 2024-06-15 LAB
ERYTHROCYTE [DISTWIDTH] IN BLOOD BY AUTOMATED COUNT: 12 %
HCT VFR BLD AUTO: 37.8 %
HGB BLD-MCNC: 13.2 G/DL
MCH RBC QN AUTO: 30.8 PG (ref 26–34)
MCHC RBC AUTO-ENTMCNC: 34.9 G/DL (ref 31–37)
MCV RBC AUTO: 88.1 FL
PLATELET # BLD AUTO: 190 10(3)UL (ref 150–450)
RBC # BLD AUTO: 4.29 X10(6)UL
WBC # BLD AUTO: 14.2 X10(3) UL (ref 4–11)

## 2024-06-15 PROCEDURE — 99215 OFFICE O/P EST HI 40 MIN: CPT | Performed by: HOSPITALIST

## 2024-06-15 RX ORDER — MORPHINE SULFATE 4 MG/ML
6 INJECTION, SOLUTION INTRAMUSCULAR; INTRAVENOUS EVERY 2 HOUR PRN
Status: DISCONTINUED | OUTPATIENT
Start: 2024-06-15 | End: 2024-06-15

## 2024-06-15 RX ORDER — MORPHINE SULFATE 4 MG/ML
4 INJECTION, SOLUTION INTRAMUSCULAR; INTRAVENOUS EVERY 2 HOUR PRN
Status: DISCONTINUED | OUTPATIENT
Start: 2024-06-15 | End: 2024-06-15

## 2024-06-15 RX ORDER — MORPHINE SULFATE 2 MG/ML
2 INJECTION, SOLUTION INTRAMUSCULAR; INTRAVENOUS EVERY 2 HOUR PRN
Status: DISCONTINUED | OUTPATIENT
Start: 2024-06-15 | End: 2024-06-15

## 2024-06-15 RX ORDER — NALOXONE HYDROCHLORIDE 0.4 MG/ML
0.08 INJECTION, SOLUTION INTRAMUSCULAR; INTRAVENOUS; SUBCUTANEOUS
Status: DISCONTINUED | OUTPATIENT
Start: 2024-06-15 | End: 2024-06-15

## 2024-06-15 RX ORDER — HYDROCODONE BITARTRATE AND ACETAMINOPHEN 5; 325 MG/1; MG/1
1 TABLET ORAL EVERY 6 HOURS PRN
COMMUNITY

## 2024-06-15 RX ORDER — ASPIRIN 81 MG/1
81 TABLET ORAL DAILY
COMMUNITY
Start: 2024-06-04

## 2024-06-15 NOTE — PROGRESS NOTES
[facility]   part of [facility]     Hospitalist Progress Note     Carl Dawn Patient Status:  Outpatient in a Bed    1980 MRN [id number]   Location [facility] 3SW-A Attending João Castellanos MD   Hosp Day # 0 PCP None Pcp     Chief Complaint: s/p fall    Subjective:     Patient still with foot numbness    Objective:    Review of Systems:   A comprehensive review of systems was completed; pertinent positive and negatives stated in subjective.    Vital signs:  Temp:  [97.4 °F (36.3 °C)-98.3 °F (36.8 °C)] 98.3 °F (36.8 °C)  Pulse:  [62-97] 63  Resp:  [10-18] 18  BP: (104-121)/(55-91) 104/55  SpO2:  [95 %-100 %] 97 %    Physical Exam:    General: No acute distress  Respiratory: No wheezes, no rhonchi  Cardiovascular: S1, S2, regular rate and rhythm  Abdomen: Soft, Non-tender, non-distended, positive bowel sounds  Neuro: No new focal deficits.   Extremities: No edema      Diagnostic Data:    Labs:  Recent Labs   Lab 06/15/24  0504   WBC 14.2*   HGB 13.2   MCV 88.1   .0       Recent Labs   Lab 24  1400   CREATSERUM 1.22       Estimated Creatinine Clearance: 87.3 mL/min (based on SCr of 1.22 mg/dL).    No results for input(s): \"TROP\", \"TROPHS\", \"CK\" in the last 168 hours.    No results for input(s): \"PTP\", \"INR\" in the last 168 hours.               Microbiology    No results found for this visit on 24.      Imaging: Reviewed in Epic.    Medications:    acetaminophen  1,000 mg Oral Once    scopolamine  1 patch Transdermal Once    sennosides  17.2 mg Oral Nightly    docusate sodium  100 mg Oral BID    multivitamin  1 tablet Oral Daily    enoxaparin  40 mg Subcutaneous Daily       Assessment & Plan:      #Closed displaced intra-articular fracture of left calcaneus status post open repair  -Per ortho  -PTOT  -Pain control   -DVT prophylaxis-lovenox     #Pseudocholinesterase deficiency - patient and spouse both unaware of this dx  #Tobacco abuse          Chelo Gallo MD    Supplementary  Documentation:     Quality:  DVT Mechanical Prophylaxis:   SCDs,    DVT Pharmacologic Prophylaxis   Medication    enoxaparin (Lovenox) 40 MG/0.4ML SUBQ injection 40 mg                Code Status: Not on file  [place]: No urinary catheter in place  [place] Duration (in days):   Central line:    ESTRELLITA: 6/15/2024    Discharge is dependent on: progress  At this point Mr. Dawn is expected to be discharge to: home    The 21st Century Cures Act makes medical notes like these available to patients in the interest of transparency. Please be advised this is a medical document. Medical documents are intended to carry relevant information, facts as evident, and the clinical opinion of the practitioner. The medical note is intended as peer to peer communication and may appear blunt or direct. It is written in medical language and may contain abbreviations or verbiage that are unfamiliar.

## 2024-06-15 NOTE — OCCUPATIONAL THERAPY NOTE
OCCUPATIONAL THERAPY EVALUATION - INPATIENT    Room Number: 386/386-A  Evaluation Date: 6/15/2024     Type of Evaluation: Initial  Presenting Problem: Displaced left intra-articular joint depression type calcaneus fracture; S/p Open treatment, left calcaneus fracture    Physician Order: IP Consult to Occupational Therapy  Reason for Therapy:  ADL/IADL Dysfunction and Discharge Planning      OCCUPATIONAL THERAPY ASSESSMENT   Patient is a 44 year old male admitted on 6/14/2024 from home for Displaced left intra-articular joint depression type calcaneus fracture and Pt is s/p left calcaneus ORIF. Co-Morbidities : pseudocholinesterase deficiency, Tobacco abuse      Patient participated well in eval, able to complete ADLs and functional mobility/transfers with crutches at SBA to [facility]. All necessary education completed and Pt verbalized/demo'd understanding as appropriate. No further questions/concerns voiced. Has all necessary AE/DME. Spouse and daughter will be present to assist as needed. No OT needs anticipated at DC.     WEIGHT BEARING RESTRICTION  Weight Bearing Restriction: L lower extremity           L Lower Extremity: Non-Weight Bearing    Recommendations for nursing staff:   Transfers: SBA with crutches   Toileting location: Toilet    EVALUATION SESSION:  Patient at start of session: semi-supine   FUNCTIONAL TRANSFER ASSESSMENT  Sit to Stand: Edge of Bed  Edge of Bed: Modified Independent  Toilet Transfer: Modified Independent    BED MOBILITY  Rolling: Independent  Supine to Sit : Independent  Sit to Supine (OT): Independent  Scooting: IND    BALANCE ASSESSMENT  Static Sitting: Independent  Sitting Bilateral: Independent  Static Standing: Modified Independent  Standing Unilateral: Stand-by Assist    FUNCTIONAL ADL ASSESSMENT  LB Dressing Seated: Supervision  LB Dressing Standing: Supervision      ACTIVITY TOLERANCE: Pt on room air and denies SOB, dizziness or lightheadedness throughout session. No significant  change in vitals noted.                          O2 SATURATIONS       COGNITION  Overall Cognitive Status:  WFL - within functional limits    EDUCATION PROVIDED  Patient: Role of Occupational Therapy; Plan of Care; Adaptive Equipment Recommendations; DME Recommendations; Functional Transfer Techniques; Fall Prevention; Weight Bear Status; Posture/Positioning; Compensatory ADL Techniques; Proper Body Mechanics  Patient's Response to Education: Verbalized Understanding; Returned Demonstration    Equipment used: crutches  Demonstrates functional use    Patient End of Session: In bed;Needs met;RN aware of session/findings;Call light within reach;All patient questions and concerns addressed;SCDs in place    OCCUPATIONAL PROFILE    HOME SITUATION  Type of Home: Apartment  Home Layout: One level (2nd floor apartment)  Lives With: Spouse;Daughter    Toilet and Equipment: Standard height toilet  Shower/Tub and Equipment: Tub-shower combo  Other Equipment:  (crutches, knee scooter)    Occupation/Status:      Drives: Yes       Prior Level of Function: Pt lives in 2nd floor apartment with his spouse and teenage daughter. Pt is typically independent with all aspects of mobility and self-cares without device. Pt has been functioning at home for the last 3 weeks since injury with NWB to LLE using crutches in community and knee scooter in the home. Spouse is present when performing stairs in/out of apartment.     SUBJECTIVE  \"I quit smoking as soon as this happened.\"    PAIN ASSESSMENT  Ratin  Location: denies       OBJECTIVE  Precautions: None  Fall Risk: High fall risk    WEIGHT BEARING RESTRICTION  Weight Bearing Restriction: L lower extremity           L Lower Extremity: Non-Weight Bearing    AM-PAC ‘6-Clicks’ Inpatient Daily Activity Short Form  -   Putting on and taking off regular lower body clothing?: None  -   Bathing (including washing, rinsing, drying)?: None  -   Toileting, which includes using toilet,  bedpan or urinal? : None  -   Putting on and taking off regular upper body clothing?: None  -   Taking care of personal grooming such as brushing teeth?: None  -   Eating meals?: None    AM-PAC Score:  Score: 24  Approx Degree of Impairment: 0%  Standardized Score (AM-PAC Scale): 57.54      ADDITIONAL TESTS     NEUROLOGICAL FINDINGS        PLAN   Patient has been evaluated and presents with no skilled Occupational Therapy needs at this time.  Patient discharged from Occupational Therapy services.  Please re-order if a new functional limitation presents during this admission.      Patient Evaluation Complexity Level:   Occupational Profile/Medical History LOW - Brief history including review of medical or therapy records    Specific performance deficits impacting engagement in ADL/IADL LOW  1 - 3 performance deficits    Client Assessment/Performance Deficits MODERATE - Comorbidities and min to mod modifications of tasks    Clinical Decision Making LOW - Analysis of occupational profile, problem-focused assessments, limited treatment options    Overall Complexity LOW     OT Session Time: 20 minutes

## 2024-06-15 NOTE — DISCHARGE INSTRUCTIONS
Postoperative Discharge Instructions for the Patient  Dr. João Castellanos MD, Orthopaedic Foot & Ankle Surgeon  For all questions, please call [phone]      Instructions for Postoperative Care    Please keep dressing clean and dry and intact. If you have a splint or cast, please keep it on and intact.   Do not remove. Call if you have concerns or problems.  Please lie down and elevate the limb you had surgery on above the level of your heart using pillows, blankets or foam wedges.  Stay lying down and keep the limb elevated around the clock as soon as you get home. It should only be down below heart level to go the bathroom.    Showers   No Showering for the first 48-72 hours after surgery, due to pain, swelling and concern for falls.  After 48-72 hours, you may shower, but you must place your limb in a well-sealed plastic bag.  Also, you must be able to tolerate having limb below heart level for the entire showering process.   You should purchase a shower stool/chair so you can sit in the shower and bathe. Please take care to avoid slips or falls.    Activity  Non-weight bearing (unless you have been instructed otherwise). Your foot must not touch the ground when you are standing, walking, showering, etc.  Use your crutches/walker/scooter as directed.  ?  Medications  Blood Clot Prevention: You have been instructed to use Aspirin or Lovenox injections once a day. You should begin the anticoagulation medicine the day after surgery.   Pain: Use only the pain medications you have been prescribed and follow the instructions given.  While on Opioid pain medication, please DO NOT:  Drive  Operate Heavy Machinery/Power Tools  Drink any beverages containing alcohol    All Opioid pain medication causes some degree of itching, sedation, constipation and nausea. Drink plenty of water to remain hydrated helps with these effects.    If you anticipate running out of pain medication before your next appointment, please call  during office hours, Monday through Friday to discuss refills    Home Medications  You may resume all these mediations after surgery with the following exceptions:  Immune Modulating Drugs: Such as medications for rheumatoid arthritis, psoriasis and chemotherapy  Steroids: Such as medications for asthma, inflammatory conditions  Anticoagulation: This is different from the blood clot prevention medication. These are your anticoagulant medications for pre-existing hematological conditions    PLEASE CHECK WITH DOCTOR/NURSE BEFORE SURGERY REGARDING WHEN TO RE-START THESE MEDICATIONS AFTER SURGERY    Please call [phone] if you have the following  Excessive swelling that doesn't improve with elevation  Foul smelling odor from your wounds or dressings  Pus discharging from your surgical wounds  Persistent severe pain that does not get better with the prescription pain medication & elevation  Persistent nausea and/or vomiting  Fevers greater than 101.5 after the first 48 hrs post op  Dramatic changes to your post-operative pain    If you experience any of the following, please immediately go to your nearest Emergency Room  Chest Pain  Shortness of Breath/ Difficulty Breathing  Uncontrolled bleeding at the site of surgery

## 2024-06-15 NOTE — OPERATIVE REPORT
[facility]    PATIENT'S NAME: ALISSON LEMUS   ATTENDING PHYSICIAN: João Castellanos MD   OPERATING PHYSICIAN: João Castellanos MD   PATIENT ACCOUNT#:   [id number]    LOCATION:  [address] [place] [address]  MEDICAL RECORD #:   [id number]       YOB: 1980  ADMISSION DATE:       06/14/2024      OPERATION DATE:  06/14/2024    OPERATIVE REPORT      PREOPERATIVE DIAGNOSIS:  Displaced left intra-articular joint depression type calcaneus fracture.  POSTOPERATIVE DIAGNOSIS:  Displaced left intra-articular joint depression type calcaneus fracture.  PROCEDURE:  Open treatment, left calcaneus fracture (CPT code 06219).    ASSISTANT:  MARTHA Hung.  Please note a skilled and experienced assistant was necessary for the safe and effective performance of the operation.  The assistant participated in positioning, prepping and draping, retraction and passage of instruments as well as help with reducing the fracture and manipulating the bones to achieve an anatomic alignment.  The assistant also participated in wound closure, dressing application, including splint application.    TOURNIQUET TIME:  120 minutes.  COMPLICATIONS:  None apparent.  ANESTHESIA:  General plus regional.  ESTIMATED BLOOD LOSS:  Less than 5 mL.  INTRAVENOUS FLUIDS:  1 L.    INDICATIONS:  The patient sustained a closed displaced intra-articular joint depression type calcaneus fracture approximately 14 days ago.  He was evaluated and a discussion was had with the risks and benefits of surgery, and the nature and purpose of the operation to treat his displaced intra-articular joint depression type calcaneus fracture.  He and his wife had an opportunity for all their questions to be answered before he freely consented to proceed with the operation.      FINDINGS:  There was a Rider type 2A displaced intra-articular joint depression type fracture that was able to be anatomically reduced.  I was able to restore the articular surface to anatomic  congruency as well I corrected his varus deformity of his heel and his lateral cortical wall blowout deformity.    OPERATIVE TECHNIQUE:  The patient was identified in the preoperative holding area, surgical site marked, and history and physical updated.  He was brought into the operating room where a regional and general anesthetic was administered.  Specifically, a block was placed in his left lower extremity.    At this time, he was positioned in decubitus position with his left side facing the ceiling.  An axillary roll and all bony prominences were padded.  A tourniquet had been placed in the left proximal thigh under copious Webril padding, and the leg was elevated during the prep and drape to exsanguinate it.  Once the time-out was taken, paused and confirmed by all members of the surgical team, the tourniquet was inflated to 300 mmHg.    Attention turned to the lateral aspect of his left hindfoot.  Here, a lateral L-shaped extensile incision was made anterior to the border of the Achilles tendon and superior to the glabrous border of the heel.  This incision was made, and then a full-thickness flap was elevated off the lateral aspect of the calcaneus.  K-wires were placed in the fibular shaft, talar neck, and cuboid to act as self-retaining retractors once this flap had been elevated to expose the lateral aspect of the calcaneus and the subtalar joint.    At this time, based on my CT images, I now elevated his lateral cortical wall blowout fragment and put on the back table.  By removing this, I could now gain access to his depressed intra-articular superolateral fragment.  I now mobilized the superolateral fragment and temporarily took it out of the body and placed it on the back table in a saline-soaked gauze.  This now allowed me access to the sustentacular and tuberosity fragments, and I could follow the primary fracture line down medially to the medial cortex. There was comminution here..  I was able to  mobilize the tuberosity fragment and the sustentacular fragment such that with a joystick I could pull the tuberosity out to length and out of its varus position.  I placed two 2-0 K-wires to help hold this alignment. The medial cortical comminution made it hard to read a visual reduction, but the fluoroscopic images confirmed it.    In addition, I now placed the superolateral fragment back into the body and anatomically reduced it to the joint itself first and held it with two 1.25 mm K-wires.    Additional manipulation was necessary to make sure that the superolateral fragment sit anatomically on its surface, and I could now bring the tuberosity more medial and bring it out to length longitudinally to fully correct the varus deformity.  To do this, I had to back out some of my provisional K-wires that I had in the tuberosity fragment to fine tune my reduction until I achieved it based on the intraoperative Cat axial views.  Once this was done, I packed in cancellous bone graft with demineralized bone matrix into the large void that was beneath the superolateral fragment due to impaction of the thalamic cancellous bone.    I now began fixation of the fracture.  I placed two 3.5 cortical lag screws from lateral to medial across the posterior facet.  Both screws had excellent bite and compressed the fracture holding it anatomically reduced.  I then selected the large size Synthes variable angle locking calcaneal plate and it sat on the bone nicely.  No contouring was necessary, and this confirmed that the varus deformity had been fully corrected.  I now went about fixating the plate to bone.  In the anterior cluster that extended into the anterior process of the calcaneus, 4 locking 2.7 cortical screws were placed.  In the perimeter of the tuberosity, an additional 4 screws were placed.  I placed 1 screw at the underneath the angle of Gissane and that went across into the sustentaculum.    At this time, I removed  all my provisional K-wire fixation as I placed the plate and screws.  Final fluoroscopic images were taken which confirmed that the screws were appropriate length had restored the posterior facet to anatomic alignment and the heel was now out of varus.    I now placed a Hemovac drain and closed the lateral extensile wound in layers.  He was then placed in a well-padded short-leg splint with his ankle in slight equinus to provide soft tissue rest to the surgery site, and he was then brought to the recovery room having tolerated the procedure well.    Of note, all sponge counts and needle counts were correct x2.    DISPOSITION:  The patient will be kept nonweightbearing, keep his foot elevated.  He will be admitted to the hospital overnight for pain control, and we will monitor his Hemovac drain output.  He will be mobilized with physical therapy tomorrow, and we will assess his pain control when his block wears off about his disposition for going home.    Dictated By João Castellanos MD  d: 06/14/2024 12:18:37  t: 06/14/2024 22:11:56  [facility] [id number]  [facility]/

## 2024-06-15 NOTE — PROGRESS NOTES
ORTHOPEDIC SURGERY PROGRESS NOTE  Ortho Attending: Mednia Andujar MD    SUBJECTIVE:  Patient is doing well. He has no pain.     OBJECTIVE:  Vitals:    06/15/24 0600   BP:    Pulse: 63   Resp:    Temp:       No acute distress, well appearing, normal WOB  Short leg splint is in place on the LLE  Unable to assess motor or sensory function distally as regional block is still in effect  WWP toes, 2+ DP    Lab Results   Component Value Date    WBC 14.2 06/15/2024    HGB 13.2 06/15/2024    HCT 37.8 06/15/2024    .0 06/15/2024    CREATSERUM 1.22 06/14/2024     Drain output since surgery: 360 mL    ASSESSMENT AND PLAN:  This is a 44M who is POD1 s/p L calcaneus ORIF, doing well.     - NWB short leg splint  - Daily PT/OT  - Monitor drain output, may pull later today after patient works with PT/OT  - Pain control including regional block   - LMWH for DVT ppx  - Hosp for co-management, appreciate recs   - Ancef x 24  - Discharge pending progress and above    Medina Andujar MD  [facility]  Orthopaedic Surgery

## 2024-06-15 NOTE — PHYSICAL THERAPY NOTE
PHYSICAL THERAPY EVALUATION - INPATIENT     Room Number: 386/386-A  Evaluation Date: 6/15/2024  Type of Evaluation: Initial  Physician Order: PT Eval and Treat    Presenting Problem: s/p L calcaneous ORIF 24  Co-Morbidities : pseudocholinesterase deficiency, Tobacco abuse  Reason for Therapy: Mobility Dysfunction and Discharge Planning    PHYSICAL THERAPY ASSESSMENT   Patient is a 44 year old male admitted 2024 for L calcaneous ORIF s/p fall off truck trailer at work and subsequent closed intra articular joint depression calcaneus fracture 24.  Patient is currently functioning near baseline with bed mobility, transfers, gait, and stair negotiation. Prior to admission, patient's baseline is independent.     PLAN  Patient has been evaluated and presents with no skilled Physical Therapy needs at this time.  Patient discharged from Physical Therapy services.  Please re-order if a new functional limitation presents during this admission.    GOALS  Patient was able to achieve the following goals ...    Patient was able to transfer Safely and independently   Patient able to ambulate on level surfaces Safely and independently         HOME SITUATION  Type of Home: Apartment   Home Layout: One level  Stairs to Enter : 14  Railing: Yes          Lives With: Spouse;Daughter  Drives: Yes  Patient Owned Equipment: Crutches       Prior Level of [place]: Pt lives with spouse and 18 y.o. daughter in 2nd floor apartment. Pt independent with ADL and mobility. Pt works as a . Pt has been NWB since fall at work on 24.     SUBJECTIVE  \"I have been doing this at home.\"       OBJECTIVE  Precautions: None  Fall Risk: High fall risk    WEIGHT BEARING RESTRICTION  Weight Bearing Restriction: L lower extremity           L Lower Extremity: Non-Weight Bearing    PAIN ASSESSMENT  Ratin          COGNITION  Overall Cognitive Status:  WFL - within functional limits    RANGE OF MOTION AND STRENGTH  ASSESSMENT  Upper extremity ROM and strength are within functional limits     Lower extremity ROM is within functional limits except limited L ankle PF/DF due to cast    Lower extremity strength is within functional limits       BALANCE  Static Sitting: Good  Dynamic Sitting: Good  Static Standing: Fair  Dynamic Standing: Fair    ADDITIONAL TESTS                                    ACTIVITY TOLERANCE                         O2 WALK       NEUROLOGICAL FINDINGS                        AM-PAC '6-Clicks' INPATIENT SHORT FORM - BASIC MOBILITY  How much difficulty does the patient currently have...  Patient Difficulty: Turning over in bed (including adjusting bedclothes, sheets and blankets)?: None   Patient Difficulty: Sitting down on and standing up from a chair with arms (e.g., wheelchair, bedside commode, etc.): None   Patient Difficulty: Moving from lying on back to sitting on the side of the bed?: None   How much help from another person does the patient currently need...   Help from Another: Moving to and from a bed to a chair (including a wheelchair)?: None   Help from Another: Need to walk in hospital room?: None   Help from Another: Climbing 3-5 steps with a railing?: A Little       AM-PAC Score:  Raw Score: 23   Approx Degree of Impairment: 11.2%   Standardized Score (AM-PAC Scale): 56.93   CMS Modifier (G-Code): CI    FUNCTIONAL ABILITY STATUS  Gait Assessment   Functional Mobility/Gait Assessment  Gait Assistance: Modified independent  Distance (ft): 150  Assistive Device: Crutches  Stairs: Stairs  How Many Stairs: 4 x 2  Device: 1 Crutch;1 Rail  Assist: Supervision  Pattern: Ascend and Descend  Ascend and Descend : Step to    Skilled Therapy Provided   Independent with bed mobility.   MOD I for sit-stand with AC.   Ambulatory with AC and MOD I.   Ambulates with swing through pattern and good maintenance of NWB on L LE.  VC for pacing and safety throughout session.   Ascended/descended 4 stairs x 2 reps with  supervision.   VC for pacing and sequencing.   Seated rest break.   Pt returned to room sitting EOB.     Bed Mobility:  Rolling: I  Supine to sit: I   Sit to supine: I     Transfer Mobility:  Sit to stand: MOD I   Stand to sit: MOD I  Gait = MOD I     Therapist's comments: Reviewed NWB recommending and safe footwear during ambulation and stair negotiation.     Exercise/Education Provided:  Bed mobility  Energy conservation  Functional activity tolerated  Gait training  Posture  Strengthening  Transfer training    Patient End of Session: In bed;Needs met;Call light within reach;RN aware of session/findings;All patient questions and concerns addressed    Patient Evaluation Complexity Level:  History Moderate - 1 or 2 personal factors and/or co-morbidities   Examination of body systems Low - addressing 1-2 elements   Clinical Presentation Low - Stable   Clinical Decision Making Low Complexity       PT Session Time: 20 minutes  Gait Training:  minutes  Therapeutic Activity: 10 minutes  Neuromuscular Re-education:  minutes  Therapeutic Exercise:  minutes

## 2024-06-15 NOTE — PLAN OF CARE
Patient alert and oriented x4. VSS, on RA. Denies pain to LLE. Short leg splint in place, sensation slowly coming back, patient able to feel toes and wiggle in splint. Bed in trendelenburg for elevation of LLE. Pt up with crutches in room, NWB to LLE, tolerating well. PO PRN norco given. Voiding freely, tolerating PO intake, denies N/V.       Plan: Pain management, possible discharge home this afternoon.   Drain removed by Dr. Castellanos this AM.

## 2024-06-15 NOTE — PROGRESS NOTES
Appreciate Dr. Patel seeing this patient    Block is starting to wear off.  We will start PO pain meds, but I ordered IV Morphine PRN.  We will see what his post block pain level is and what it takes to manage it before he goes home
